# Patient Record
Sex: MALE | Race: WHITE
[De-identification: names, ages, dates, MRNs, and addresses within clinical notes are randomized per-mention and may not be internally consistent; named-entity substitution may affect disease eponyms.]

---

## 2018-02-20 ENCOUNTER — HOSPITAL ENCOUNTER (EMERGENCY)
Dept: HOSPITAL 56 - MW.ED | Age: 33
Discharge: HOME | End: 2018-02-20
Payer: COMMERCIAL

## 2018-02-20 DIAGNOSIS — M54.12: Primary | ICD-10-CM

## 2018-02-20 LAB
CHLORIDE SERPL-SCNC: 108 MMOL/L (ref 98–110)
SODIUM SERPL-SCNC: 142 MMOL/L (ref 136–146)

## 2018-02-20 NOTE — CT
EXAMINATION:  Non contrast CT head. Coronal and sagittal reformats.

 

HISTORY: Dizziness

 

FINDINGS:  

No evidence of intra or extra axial hemorrhage, mass,  midline shift, hydrocephalus or edema.

 

No hypoattenuation changes in the major vascular territories to suggest acute infarct.  

 

No abnormal intracranial calcifications are detected.  No evidence of substantial vascular calcificat
ions.

 

Paranasal sinuses and mastoid air cells are well aerated without substantial findings.  The orbits an
d globes are symmetric.

 

Pituitary fossa appears unremarkable. Old nasal bone fractures noted.

 

Calvarium is intact. No evidence of skull fracture. 

 

IMPRESSION: 

No acute intracranial findings.

## 2018-02-20 NOTE — EDM.PDOC
ED HPI GENERAL MEDICAL PROBLEM





- General


Chief Complaint: General


Stated Complaint: DIZZY


Time Seen by Provider: 02/20/18 10:58


Source of Information: Reports: Patient





- History of Present Illness


INITIAL COMMENTS - FREE TEXT/NARRATIVE: 





Presents reporting a couple of days ago he was stretching his arms at breakfast 

when he had a sudden onset of dizziness.  Resolve spontaneously. Since that 

time he has had several other episodes. He became concerned when he had an 

episode, after turning his head to a certain position, where he blacked out for 

a brief second or 2. He also has some numbness off and on in his right neck and 

right lower arm and hand.  No known injury. He works out in the gym 

occasionally. He does do a daily stretching routine. He is otherwise healthy. 

He denies any fever, UR symptoms, visual symptoms, headache, chest pain, 

shortness breath.  He did have a upper respiratory illness approximately 2 

weeks ago which resolved on its own within about a week.


  ** neck


Pain Score (Numeric/FACES): 4





- Related Data


 Allergies











Allergy/AdvReac Type Severity Reaction Status Date / Time


 


No Known Allergies Allergy   Verified 02/20/18 10:57











Home Meds: 


 Home Meds





Cyclobenzaprine [Flexeril] 10 mg PO TID PRN #20 tab 02/20/18 [Rx]


predniSONE [predniSONE Dose Pack] 1 packet PO ASDIRECTED #1 dospk 02/20/18 [Rx]











Past Medical History


HEENT History: Reports: None


Cardiovascular History: Reports: None


Respiratory History: Reports: None


Gastrointestinal History: Reports: None


Genitourinary History: Reports: None


Musculoskeletal History: Reports: None


Neurological History: Reports: None


Psychiatric History: Reports: None


Endocrine/Metabolic History: Reports: None


Hematologic History: Reports: None


Immunologic History: Reports: None


Oncologic (Cancer) History: Reports: None


Dermatologic History: Reports: None





- Infectious Disease History


Infectious Disease History: Reports: Chicken Pox


Other Infectious Disease History: childhood





- Past Surgical History


Head Surgeries/Procedures: Reports: None


HEENT Surgical History: Reports: Tonsillectomy


Cardiovascular Surgical History: Reports: None


Respiratory Surgical History: Reports: None


GI Surgical History: Reports: None


Male  Surgical History: Reports: None


Endocrine Surgical History: Reports: None


Neurological Surgical History: Reports: None


Musculoskeletal Surgical History: Reports: None


Oncologic Surgical History: Reports: None


Dermatological Surgical History: Reports: None





Social & Family History





- Family History


Family Medical History: Noncontributory





- Tobacco Use


Smoking Status *Q: Never Smoker


Second Hand Smoke Exposure: No





- Caffeine Use


Caffeine Use: Reports: Coffee, Energy Drinks, Soda, Tea





- Recreational Drug Use


Recreational Drug Use: No





ED ROS GENERAL





- Review of Systems


Review Of Systems: ROS reveals no pertinent complaints other than HPI.





ED EXAM, GENERAL





- Physical Exam


Exam: See Below


Exam Limited By: No Limitations


General Appearance: Alert, No Apparent Distress


Ears: Normal External Exam, Normal TMs


Ear Exam: Bilateral Ear: TM normal


Nose: Normal Inspection


Throat/Mouth: Normal Inspection, Normal Oropharynx


Head: Atraumatic, Normocephalic


Neck: Normal Inspection, Supple, Non-Tender, Full Range of Motion, Other (Full 

range of motion but does complain of dizziness with extension.).  No: 

Lymphadenopathy (L), Lymphadenopathy (R)


Respiratory/Chest: No Respiratory Distress, Lungs Clear, Normal Breath Sounds


Cardiovascular: Normal Peripheral Pulses, Regular Rate, Rhythm, No Murmur


GI/Abdominal: Soft


Back Exam: Normal Inspection, Full Range of Motion.  No: Paraspinal Tenderness, 

Vertebral Tenderness


Extremities: Normal Inspection, Normal Range of Motion, Other (Right shoulder 

full range of motion without hesitation or limitation.  )


Neurological: Alert, Oriented, CN II-XII Intact, Normal Cognition, Normal Gait, 

No Motor/Sensory Deficits


Psychiatric: Normal Affect, Normal Mood


Skin Exam: Warm, Dry, Intact, Normal Color, No Rash


Lymphatic: No Adenopathy





Course





- Vital Signs


Last Recorded V/S: 





 Last Vital Signs











Temp  36.2 C   02/20/18 10:57


 


Pulse  67   02/20/18 12:04


 


Resp  18   02/20/18 12:04


 


BP  139/74   02/20/18 12:04


 


Pulse Ox  97   02/20/18 12:04














- Orders/Labs/Meds


Orders: 





 Active Orders 24 hr











 Category Date Time Status


 


 EKG Documentation Completion [RC] STAT Care  02/20/18 11:20 Ordered











Labs: 





 Laboratory Tests











  02/20/18 02/20/18 Range/Units





  11:36 11:36 


 


WBC  5.04   (4.0-11.0)  K/uL


 


RBC  4.74   (4.50-5.90)  M/uL


 


Hgb  13.9   (13.0-17.0)  g/dL


 


Hct  41.4   (38.0-50.0)  %


 


MCV  87.3   (80.0-98.0)  fL


 


MCH  29.3   (27.0-32.0)  pg


 


MCHC  33.6   (31.0-37.0)  g/dL


 


RDW Std Deviation  42.6   (28.0-62.0)  fl


 


RDW Coeff of Lis  13   (11.0-15.0)  %


 


Plt Count  212   (150-400)  K/uL


 


MPV  11.30   (7.40-12.00)  fL


 


Neut % (Auto)  60.1   (48.0-80.0)  %


 


Lymph % (Auto)  26.6   (16.0-40.0)  %


 


Mono % (Auto)  9.5   (0.0-15.0)  %


 


Eos % (Auto)  3.2   (0.0-7.0)  %


 


Baso % (Auto)  0.6   (0.0-1.5)  %


 


Neut # (Auto)  3.0   (1.4-5.7)  K/uL


 


Lymph # (Auto)  1.3   (0.6-2.4)  K/uL


 


Mono # (Auto)  0.5   (0.0-0.8)  K/uL


 


Eos # (Auto)  0.2   (0.0-0.7)  K/uL


 


Baso # (Auto)  0.0   (0.0-0.1)  K/uL


 


Nucleated RBC %  0.0   /100WBC


 


Nucleated RBCs #  0   K/uL


 


Sodium   142  (136-146)  mmol/L


 


Potassium   4.1  (3.5-5.1)  mmol/L


 


Chloride   108  ()  mmol/L


 


Carbon Dioxide   27  (21-31)  mmol/L


 


BUN   12  (6.0-23.0)  mg/dL


 


Creatinine   1.0  (0.6-1.5)  mg/dL


 


Est Cr Clr Drug Dosing   130.20  mL/min


 


Estimated GFR (MDRD)   > 60.0  ml/min


 


Glucose   100  ()  mg/dL


 


Calcium   9.4  (8.8-10.8)  mg/dL


 


Total Bilirubin   0.7  (0.1-1.5)  mg/dL


 


AST   16  (5-40)  IU/L


 


ALT   23  (8-54)  IU/L


 


Alkaline Phosphatase   62  ()  


 


Total Protein   6.9  (6.0-8.0)  g/dL


 


Albumin   4.3  (3.5-5.0)  g/dL


 


Globulin   2.6  (2.0-3.5)  g/dL


 


Albumin/Globulin Ratio   1.7  (1.3-2.8)  














Departure





- Departure


Time of Disposition: 13:08


Disposition: Home, Self-Care 01


Condition: Good


Clinical Impression: 


 Cervical radiculopathy








- Discharge Information


Referrals: 


PCP,None [Primary Care Provider] - 


[facility] [Outside]


[facility] [Outside]


Additional Instructions: 


1.  May take your muscle relaxant three times daily as needed.  No driving or 

operating machinery


2.  Prednisone 2 tabs daily for 5 days.  After that Aleve 2 tabs twice daily or 

ibuprofen 2-3 tabs 3 times daily


3.  Follow up in primary care for further evaluation.  Your EKG, head CT, 

chemistries and hgb were normal.





- My Orders


Last 24 Hours: 





My Active Orders





02/20/18 11:20


EKG Documentation Completion [RC] STAT 














- Assessment/Plan


Last 24 Hours: 





My Active Orders





02/20/18 11:20


EKG Documentation Completion [RC] STAT